# Patient Record
Sex: MALE | Race: WHITE | NOT HISPANIC OR LATINO | URBAN - METROPOLITAN AREA
[De-identification: names, ages, dates, MRNs, and addresses within clinical notes are randomized per-mention and may not be internally consistent; named-entity substitution may affect disease eponyms.]

---

## 2021-03-04 ENCOUNTER — IMMUNIZATION (OUTPATIENT)
Dept: VACCINE CLINIC | Facility: HOSPITAL | Age: 46
End: 2021-03-04

## 2021-03-04 PROCEDURE — 91301 HC SARSCO02 VAC 100MCG/0.5ML IM: CPT | Performed by: INTERNAL MEDICINE

## 2021-03-04 PROCEDURE — 0011A: CPT | Performed by: INTERNAL MEDICINE

## 2021-04-01 ENCOUNTER — IMMUNIZATION (OUTPATIENT)
Dept: VACCINE CLINIC | Facility: HOSPITAL | Age: 46
End: 2021-04-01

## 2021-04-01 PROCEDURE — 91301 HC SARSCO02 VAC 100MCG/0.5ML IM: CPT | Performed by: INTERNAL MEDICINE

## 2021-04-01 PROCEDURE — 0012A: CPT | Performed by: INTERNAL MEDICINE

## 2021-05-03 ENCOUNTER — OFFICE VISIT (OUTPATIENT)
Dept: ORTHOPEDIC SURGERY | Facility: CLINIC | Age: 46
End: 2021-05-03

## 2021-05-03 VITALS — BODY MASS INDEX: 37.8 KG/M2 | WEIGHT: 270 LBS | HEIGHT: 71 IN

## 2021-05-03 DIAGNOSIS — G89.29 CHRONIC PAIN OF RIGHT ANKLE: Primary | ICD-10-CM

## 2021-05-03 DIAGNOSIS — M25.571 CHRONIC PAIN OF RIGHT ANKLE: Primary | ICD-10-CM

## 2021-05-03 DIAGNOSIS — M79.671 FOOT PAIN, RIGHT: ICD-10-CM

## 2021-05-03 DIAGNOSIS — I87.8 VENOUS STASIS: ICD-10-CM

## 2021-05-03 PROCEDURE — 99203 OFFICE O/P NEW LOW 30 MIN: CPT | Performed by: ORTHOPAEDIC SURGERY

## 2021-05-03 NOTE — PROGRESS NOTES
"NEW PATIENT    Patient: Newton Rosado  : 1975    Primary Care Provider: Jens Romo DO    Requesting Provider: As above    Pain of the Right Ankle (Right ankle ORIF 2016 Dr. Martin)      History    Chief Complaint: right ankle/leg pain    History of Present Illness: this is a very pleasant 46 year old gentleman who sustained a severe right pilon fracture in , treated with ORIF in [place].  He went on to heal and has very good ROM.  However he has aching in the leg with weather change, with extensive activity, etc.  He notes that last Feb he was in [place] walking a lot and developed redness and swelling mid calf.  He does not wear compression socks except when traveling.  He has some numbness in the 5th toe and metatarsal area on the right that was present before the fracture, due to sciatic issues. He notes that ice makes the pain worse, heat helps.  He is wondering if this is his \"new normal\".  He works in sales, mostly sitting occupation. He does smoke cigars, I recommend he quit. I explained the risks of smoking, including hardening of the arteries, gangrene, amputation.  I explained nicotine poisons bone cells and increases the risk of nonunion of fractures and fusions.  I explained that studies show that smokers have FOUR times the risk of the general population when they have foot or ankle surgery.  (5min)       Tobacco Use: High Risk   • Smoking Tobacco Use: Current Some Day Smoker   • Smokeless Tobacco Use: Never Used     .     Current Outpatient Medications on File Prior to Visit   Medication Sig Dispense Refill   • [DISCONTINUED] cetirizine (ZyrTEC) 10 MG tablet Take 10 mg by mouth daily.     • [DISCONTINUED] cyclobenzaprine (FLEXERIL) 10 MG tablet TK 1 T PO ONCE DAILY PRN  2     No current facility-administered medications on file prior to visit.      Allergies   Allergen Reactions   • Aspirin Anaphylaxis      Past Medical History:   Diagnosis Date   • Dizziness    • " "Fracture, fibula    • Night sweats    • Ringing in ears      Past Surgical History:   Procedure Laterality Date   • ANKLE OPEN REDUCTION INTERNAL FIXATION     • HAND SURGERY Right      Family History   Problem Relation Age of Onset   • Hypertension Other    • No Known Problems Mother    • Arthritis Father    • Hypertension Father       Social History     Socioeconomic History   • Marital status:      Spouse name: Not on file   • Number of children: Not on file   • Years of education: Not on file   • Highest education level: Not on file   Tobacco Use   • Smoking status: Current Some Day Smoker     Types: Pipe   • Smokeless tobacco: Never Used   Substance and Sexual Activity   • Alcohol use: Yes     Comment: about 6 drinks per week   • Drug use: No   • Sexual activity: Defer        Review of Systems   Constitutional: Negative.    HENT: Negative.    Eyes: Negative.    Respiratory: Positive for apnea.    Cardiovascular: Negative.    Gastrointestinal: Negative.    Endocrine: Negative.    Genitourinary: Negative.    Musculoskeletal: Positive for arthralgias.   Skin: Negative.    Allergic/Immunologic: Positive for environmental allergies.   Neurological: Negative.    Hematological: Negative.    Psychiatric/Behavioral: Positive for sleep disturbance.       The following portions of the patient's history were reviewed and updated as appropriate: allergies, current medications, past family history, past medical history, past social history, past surgical history and problem list.    Physical Exam:   Ht 180.3 cm (70.98\")   Wt 122 kg (270 lb)   BMI 37.67 kg/m²   GENERAL: Body habitus: obese    Lower extremity edema: Right: trace; Left: trace    Varicose veins:  Right:mild  venous stasis pigment; Left:mild venous stasis pigment    Gait: normal     Mental Status:  awake and alert; oriented to person, place, and time    Voice:  clear  SKIN:  Lower extremity: mild venous pigment    Hair Growth(lower extremity):  " "Right:normal; Left:  normal  NAILS: Toenails: normal  HEENT: Head: Normocephalic, atraumatic,  without obvious abnormality.  eye: normal external eye, no icterus  ears:normal external ears  PULM:  Repiratory effort normal  CV:  Dorsalis Pedis:  Right: 2+; Left:2+    Posterior Tibial: Right:2+; Left:2+    Capillary Refill:  Brisk  MSK:  Hand:sensation intact      Tibia:  Right:  tender over subcutaneous border; Left:  tender over subcutaneous border      Ankle:  Right: Mildly tender over the hardware, very good range of motion almost symmetric with the left, lacking only a few degrees of inversion eversion and plantarflexion, well-healed medial and lateral incisions; Left:  non tender, ROM  normal and motor function  normal      Foot:  Right:  non tender, ROM  normal and motor function  normal; Left:  non tender, ROM  normal and motor function  normal      NEURO: Heel Walking:  Right:  normal; Left:  normal    Toe Walking:  Right:  normal; Left:  normal     [place] 5.07 monofilament test: Decreased right fifth toe and metatarsal head area otherwise intact    Lower extremity sensation: Intact except for right fifth toe and metatarsal area     Reflexes:  Biceps:  Right:  not tested; Left:  not tested           Quads:  Right:  not tested; Left:  not tested           Ankle:  Right:  not tested; Left:  not tested      Calf Atrophy:none    Motor Function: all 5/5         Medical Decision Making    Data Review:   ordered and reviewed x-rays today, reviewed radiology images, reviewed radiology results and reviewed outside records    Assessment and Plan/ Diagnosis/Treatment options:   1. Chronic pain of right ankle and leg  I explained to the patient that I agree with him, this is his \"new normal\".  Radiographically and physiologically, he has had an excellent result from what looks like a very difficult fracture.  He has almost symmetric range of motion, excellent cartilage space on the weightbearing films.  He was " worried that the screws might be backing out I explained there not, and that would be very rare.  We talked about removing the hardware but I would not recommend that, the risk of refracture would be high.  I think 2 things will help his symptoms.  1 is to wear compression socks daily.  I explained how these help.  I looked at his picture from [facility] and the redness is from dependent rubor.  Secondly we talked about weight loss.  He has gained 30 pounds during the pandemic which is common.  His BMI is 37.67, and he knows weight loss would help.  I explained that I understand his symptoms are not perfect, but given the severity of the original fracture he really has had a good outcome.  He was reassured.  I will be happy to see him anytime.  I did again recommend he quit smoking.  - XR Ankle 2 View Right    2. Venous stasis  I explained edema and venous stasis to the patient, and the often hereditary nature of the problem, and the contribution of weight, trauma, etc. I explained how they cause edema (due to gravity, etc) I explained how they can lead to ulceration.  I explained how they can cause pain, stiffness, aching, cramping, etc. I explained that it is a very very common problem, so common that even [employer] markets compression stockings.  I recommend wearing support stockings (compression stockings) daily, we discussed what type and where to find them                  Radiology Ordered []  Radiology Reports Reviewed []      Radiology Images Reviewed []   Labs Reviewed []    Labs Ordered []   PCP Records Reviewed []    Provider Records Reviewed []    ER Records Reviewed []    Hospital Records Reviewed []    History Obtained From Family []    Phone conversation with Provider []    Records Requested []        Trish Loco MD

## 2021-08-05 ENCOUNTER — OFFICE VISIT (OUTPATIENT)
Dept: BARIATRICS/WEIGHT MGMT | Facility: CLINIC | Age: 46
End: 2021-08-05

## 2021-08-05 VITALS
TEMPERATURE: 96.9 F | DIASTOLIC BLOOD PRESSURE: 68 MMHG | HEIGHT: 71 IN | BODY MASS INDEX: 37.59 KG/M2 | SYSTOLIC BLOOD PRESSURE: 128 MMHG | RESPIRATION RATE: 18 BRPM | OXYGEN SATURATION: 99 % | HEART RATE: 73 BPM | WEIGHT: 268.5 LBS

## 2021-08-05 DIAGNOSIS — Z51.81 ENCOUNTER FOR THERAPEUTIC DRUG MONITORING: ICD-10-CM

## 2021-08-05 DIAGNOSIS — G47.33 OSA (OBSTRUCTIVE SLEEP APNEA): ICD-10-CM

## 2021-08-05 DIAGNOSIS — G47.09 OTHER INSOMNIA: ICD-10-CM

## 2021-08-05 DIAGNOSIS — E66.01 CLASS 2 SEVERE OBESITY DUE TO EXCESS CALORIES WITH SERIOUS COMORBIDITY AND BODY MASS INDEX (BMI) OF 37.0 TO 37.9 IN ADULT (HCC): Primary | ICD-10-CM

## 2021-08-05 DIAGNOSIS — R12 HEARTBURN: ICD-10-CM

## 2021-08-05 PROCEDURE — 99215 OFFICE O/P EST HI 40 MIN: CPT | Performed by: NURSE PRACTITIONER

## 2021-08-05 RX ORDER — MULTIPLE VITAMINS W/ MINERALS TAB 9MG-400MCG
1 TAB ORAL DAILY
Qty: 30 TABLET | Refills: 2
Start: 2021-08-05

## 2021-08-05 RX ORDER — CHLORAL HYDRATE 500 MG
1000 CAPSULE ORAL 2 TIMES DAILY WITH MEALS
Start: 2021-08-05

## 2021-08-05 NOTE — ASSESSMENT & PLAN NOTE
Patient's (Body mass index is 37.45 kg/m².) indicates that they are morbidly obese (BMI > 40 or > 35 with obesity - related health condition) with health conditions that include obstructive sleep apnea and GERD . Weight is newly identified. BMI is is above average; BMI management plan is completed. We discussed low calorie, low carb based diet program, portion control, increasing exercise, joining a fitness center or start home based exercise program and an jules-based approach such as [employer] or Lose It.     Topics of discussion included obesity as a disease, nutritional education on food groups, exercise, and medications. Patient was instructed in adequate protein, controlled carb and controlled fat intake.   Patient received instructions on using the medicines as a tool in controlling their weight with nutritional and behavioral changes. Risks and benefits were discussed. I believe the potential benefits of medication helping to decrease weight outweighs the risks. Patient is to try nutritonal/behavioral changes only first.   Patient received our clinic education booklet.   Our patient consent form was reviewed including potential risks of weight loss. We also reviewed our confidentiality and HIPPA statements. Patients current FITT score was reviewed along with current capability for exercise tolerance and a patient will work towards a FITT score of:     Frequency   Intensity Time Strength Training   []   0 None  []   0 None  []   0 None  []   0 None    []   1 (1-2x/week) []   1 (light) []   1 (<10 min) []   1 (1x/week)   [x]   2 (3-5x/week) [x]   2 (moderate) []   2 (10-20 min) [x]   2 (2x/week)   []   3 (daily)   []   3 (moderately hard)  []   4 (very hard) []   3 (20-30 min)  [x]   4 (>30 min) []   3 (3-4x/week)       Patient's past medical history was reviewed in detail and barriers to weight loss were identified and discussed. Past efforts at weight reduction on their own as well as under physician  supervision were documented and discussed.  I advised patient to continue routine care with their Primary Care Provider.     Nutritional recommendations and goals were reviewed including Calories:2063-8005 daily adjusted for exercise calories burnt, Protein: 124-155 g daily, Net carbs (total carb - fiber) of 50-75g per day.  Start to keep a food journal and bring into next visit in 2 weeks for review.   Practice the behavioral modification technique of mindful eating.   Take one MVI daily and 2000mg fish oil daily.   Take other medications and supplements as directed.  Improve sleep, set boundaries around alcohol use, increase water, make exercise plan. Consider VLCD.

## 2021-08-05 NOTE — PROGRESS NOTES
"[facility]   [address]  [place] [address]    Name: Newton Rosado  : 1975  Patient Care Team:  Jens Romo DO as PCP - General (Family Medicine)  Vignesh Maher MD as PCP - Family Medicine  Provider, No Known  Provider, No Known    Chief Complaint;: No chief complaint on file.       HPI      Office visit for Newton Rosado, a 46 y.o. {Desc; male/female:81497}, established patient with ***, for obesity management. Patient is {Satisfied/unsatisfied/unsure:92451} with weight loss progress. Hunger control has {poor/mod/well:92432}. Reports no side effects of prescribed medications today.   ***  The patient is exercising with a FITT score of:    Patient's Body mass index is 37.45 kg/m². indicating that he is {weight categories:02893}.      Change in weight since last visit: ***    Recent Weight History:   Wt Readings from Last 3 Encounters:   21 122 kg (268 lb 8 oz)   21 122 kg (270 lb)   20 116 kg (255 lb)     Start Weight: ***  Total Loss/%Loss of BBW:     Body composition analysis completed and showed: ***  %body fat:  Total fat mass (in lbs):    VS   Vitals:    21 0815   BP: 128/68   BP Location: Left arm   Patient Position: Sitting   Cuff Size: Adult   Pulse: 73   Resp: 18   Temp: 96.9 °F (36.1 °C)   TempSrc: Temporal   SpO2: 99%   Weight: 122 kg (268 lb 8 oz)   Height: 180.3 cm (71\")   PainSc: 0-No pain       Measurements (in inches)  Waist: ***    Review of Systems    Past Medical History:   Diagnosis Date   • Dizziness    • Fracture, fibula    • Heartburn    • Night sweats    • Ringing in ears        Patient Active Problem List   Diagnosis   • History of open reduction and internal fixation (ORIF) procedure       No current outpatient medications on file.    Physical Exam:    General:  .{CR New patient general:99270::\"well developed; well nourished\",\"no acute distress\",\"obese \"}   Lungs:  {Chris " "Exam-lung(s):57800::\"breathing is unlabored\",\"clear to auscultation bilaterally\"}   Heart:  {Chris Exam-cardiac:80766::\"regular rate and rhythm, S1, S2 normal, no murmur, click, rub or gallop\"}     ASSESSMENT/PLAN:   Diagnoses and all orders for this visit:    1. Encounter for therapeutic drug monitoring (Primary)  -     Urine Drug Screen - Urine, Clean Catch        I have instructed the patient to continue with pursuit of medical weight loss as a part of this program. Patient {Does(not):59192::\"does\"} meet criteria for use of anorectics at this time as {criteria:68791::\"BMI >27\"}. Continue nutritional focus and work towards new exercise FITT goal of:     The current plan for this month includes: {WeightLossInst:16894}    I spent 20 minutes face to face with patient and 15 minutes were spent counseling the patient on obesity and behavior change.     Plan of care reviewed with the patient at the conclusion of today's visit. We discussed the risks, benefits, and limitations of treatments. Continue medications and OTC supplements as discussed. Patient verbalizes understanding of and agreement with management plan.      No follow-ups on file.      ERAN Anne                      "

## 2021-08-05 NOTE — PROGRESS NOTES
[facility]    Formerly [facility] [address]  [place] [address]    Name: Newton Rosado  : 1975  Patient Care Team:  Jens Romo DO as PCP - General (Family Medicine)  Vignesh Maher MD as PCP - Family Medicine  Provider, No Known  Provider, No Known    Chief Complaint:   Chief Complaint   Patient presents with   • Consult   • Obesity      HPI:   Mr. Newton Rosado is a 46 y.o. male presents for initiation of medically supervised weight loss.  he has tried other methods/programs to lose weight including:  exercise & has not ever tried medication to assist with weight loss.  With past attempts he has been able to make lifestyle changes for a few months then his routine changes and he falls into bad habits. His weight has increased about 30lb over the last year. Prior to the pandemic he was routinely swimming at lunch time and was pretty focused on eating better. When the pool closed he lost motivation to work out. He travels to [place] a lot with his work, hasn't been exercising there and often drinks because he is bored and it allows him to socialize. He is ready for lifestyle changes to be a part of his weight loss journey. His motivation is to prevent health complications from health choices. Some areas that he desires to improve: exercise, drink less alcohol, sleep better, drink more water, eat healthier. He is interested in medications to assist with weight loss and appetite.   Had pulmonary embolism after leg surgery. No history of blood clotting disorders. He was diagnosed with DANIELE fall . Heartburn has improved with CPAP. Uses tums once or twice a month. Denies heart racing. Can tell when his blood pressure is elevated- feels swollen all over. Does have cuff at home but hasn't used it in a long time. Has had white coat syndrome in the past. Insomnia: wakes up nightly at 3:00 then has trouble falling back asleep. Reading on his phone helps. Does not drink  caffeine after 12pm. Sleep did improve when he abstained from alcohol in January.      Patient's Body mass index is 37.45 kg/m².    Lifetime non-pregnant maximum weight:   Weight 1 year ago: 250 lb  Weight 5 years ago: 240 lb  The following have contributed to weight gain: Work    The following seem to sabotage weight loss efforts:Lack of time for planning & self, comfort/stress eating, enjoyment of food and social events, eating late, liquid calories such as alcohol, specific food cravings like carbohydrates, boredom eating.     Review of Systems:   Review of Systems   Constitutional: Positive for fatigue.   HENT: Negative for trouble swallowing.    Eyes: Negative for blurred vision.   Respiratory: Negative for chest tightness and shortness of breath.    Cardiovascular: Positive for leg swelling. Negative for chest pain and palpitations.   Gastrointestinal: Positive for GERD. Negative for constipation, diarrhea and nausea.   Endocrine: Negative for polydipsia, polyphagia and polyuria.   Musculoskeletal: Positive for joint swelling. Negative for myalgias.        Right ankle- prior operation   Skin: Negative for dry skin and pallor.   Neurological: Negative for seizures, syncope, weakness and headache.   Psychiatric/Behavioral: Positive for sleep disturbance, depressed mood and stress. The patient is not nervous/anxious.        History:    Past Medical History:   Diagnosis Date   • Dizziness    • Fracture, fibula    • Heartburn    • Night sweats    • DANIELE (obstructive sleep apnea) 2020    Started CPAP november 2020   • Pulmonary embolism (CMS/HCC) 2016    Following ankle surgery    • Ringing in ears        Past Surgical History:   Procedure Laterality Date   • ANKLE OPEN REDUCTION INTERNAL FIXATION     • HAND SURGERY Right        Family History   Problem Relation Age of Onset   • Hypertension Other    • No Known Problems Mother    • Arthritis Father    • Hypertension Father    • Stroke Father        Social History  "    Socioeconomic History   • Marital status:      Spouse name: Not on file   • Number of children: Not on file   • Years of education: Not on file   • Highest education level: Not on file   Tobacco Use   • Smoking status: Current Some Day Smoker     Types: Pipe, Cigars   • Smokeless tobacco: Never Used   Substance and Sexual Activity   • Alcohol use: Yes     Comment: about 6 drinks per week   • Drug use: No   • Sexual activity: Defer       Allergies   Allergen Reactions   • Aspirin Anaphylaxis       Current Outpatient Medications:   •  multivitamin with minerals (Multivitamin Adults) tablet tablet, Take 1 tablet by mouth Daily., Disp: 30 tablet, Rfl: 2  •  Omega-3 Fatty Acids (fish oil) 1000 MG capsule capsule, Take 1 capsule by mouth 2 (Two) Times a Day With Meals., Disp: , Rfl:       Exercise:      Current FITT Score      Frequency   Intensity Time Strength Training   []   0 None  []   0 None  []   0 None  [x]   0 None    [x]   1 (1-2x/week) []   1 (light) []   1 (<10 min) []   1 (1x/week)   []   2 (3-5x/week) [x]   2 (moderate) []   2 (10-20 min) []   2 (2x/week)   []   3 (daily)   []   3 (moderately hard)  []   4 (very hard) [x]   3 (20-30 min)  []   4 (>30 min) []   3 (3-4x/week)         Water: 30 oz/day  Alcohol: CAGE is positive   Other beverages:  coffee    PHQ-9 Total Score: 6    VS   Vitals:    08/05/21 0815   BP: 128/68   BP Location: Left arm   Patient Position: Sitting   Cuff Size: Adult   Pulse: 73   Resp: 18   Temp: 96.9 °F (36.1 °C)   TempSrc: Temporal   SpO2: 99%   Weight: 122 kg (268 lb 8 oz)   Height: 180.3 cm (71\")   PainSc: 0-No pain       Start Weight/BMI:268.5 lb / 37.4  Patient's Weight Loss Goal: Lose 50 lbs    Body composition analysis completed and showed:   %body fat:35.7%  Total fat mass (in lbs):96.0 lb  Fat free mass (in lbs):172.5 lb  Total body water (in lb):126.5 lb  BMR: 2338    Measurements (in inches)  Neck: 18  Chest: 48.5  Waist: 47.25  Hips: 47  Thighs: 46    Physical " Exam:  General:  well developed; well nourished  no acute distress  central obesity   Skin:  no abnormalities noted   Thyroid: normal to inspection and palpation   Lungs:  breathing is unlabored  clear to auscultation bilaterally   Heart:  regular rate and rhythm, S1, S2 normal, no murmur, click, rub or gallop   Abdomen: abdomen is soft without significant tenderness, masses, organomegaly or guarding   Neuro: normal without focal findings, mental status, speech normal, alert and oriented x3 and CHARMAINE   Extremeties Mild edema right ankle   Psych: oriented to time, place and person, mood and affect are within normal limits, pt is a good historian; no memory problems were noted        Results Review:    I have reviewed and discussed with the patient:  Progress Notes by Trish Tavarez MD (05/03/2021 08:40): weight loss recommended  SCANNED EKG (02/19/2016): NSR    ASSESSMENT/PLAN:     Diagnoses and all orders for this visit:    1. Class 2 severe obesity due to excess calories with serious comorbidity and body mass index (BMI) of 37.0 to 37.9 in adult (CMS/[facility]) (Primary)  Assessment & Plan:  Patient's (Body mass index is 37.45 kg/m².) indicates that they are morbidly obese (BMI > 40 or > 35 with obesity - related health condition) with health conditions that include obstructive sleep apnea and GERD . Weight is newly identified. BMI is is above average; BMI management plan is completed. We discussed low calorie, low carb based diet program, portion control, increasing exercise, joining a fitness center or start home based exercise program and an jules-based approach such as iSites Pal or Lose It.     Topics of discussion included obesity as a disease, nutritional education on food groups, exercise, and medications. Patient was instructed in adequate protein, controlled carb and controlled fat intake.   Patient received instructions on using the medicines as a tool in controlling their weight with nutritional and  behavioral changes. Risks and benefits were discussed. I believe the potential benefits of medication helping to decrease weight outweighs the risks. Patient is to try nutritonal/behavioral changes only first.   Patient received our clinic education booklet.   Our patient consent form was reviewed including potential risks of weight loss. We also reviewed our confidentiality and HIPPA statements. Patients current FITT score was reviewed along with current capability for exercise tolerance and a patient will work towards a FITT score of:     Frequency   Intensity Time Strength Training   []   0 None  []   0 None  []   0 None  []   0 None    []   1 (1-2x/week) []   1 (light) []   1 (<10 min) []   1 (1x/week)   [x]   2 (3-5x/week) [x]   2 (moderate) []   2 (10-20 min) [x]   2 (2x/week)   []   3 (daily)   []   3 (moderately hard)  []   4 (very hard) []   3 (20-30 min)  [x]   4 (>30 min) []   3 (3-4x/week)       Patient's past medical history was reviewed in detail and barriers to weight loss were identified and discussed. Past efforts at weight reduction on their own as well as under physician supervision were documented and discussed.  I advised patient to continue routine care with their Primary Care Provider.     Nutritional recommendations and goals were reviewed including Calories:8743-8876 daily adjusted for exercise calories burnt, Protein: 124-155 g daily, Net carbs (total carb - fiber) of 50-75g per day.  Start to keep a food journal and bring into next visit in 2 weeks for review.   Practice the behavioral modification technique of mindful eating.   Take one MVI daily and 2000mg fish oil daily.   Take other medications and supplements as directed.  Improve sleep, set boundaries around alcohol use, increase water, make exercise plan. Consider VLCD.         2. Heartburn    3. DANIELE (obstructive sleep apnea)  Assessment & Plan:  Weight reduction of 10-15% can help.       4. Other insomnia  Assessment & Plan:  Goal  7-8 hours of uninterrupted sleep. Discussed sleep hygiene. Consider topiramate.       5. Encounter for therapeutic drug monitoring  -     Urine Drug Screen - Urine, Clean Catch    Other orders  -     Omega-3 Fatty Acids (fish oil) 1000 MG capsule capsule; Take 1 capsule by mouth 2 (Two) Times a Day With Meals.  -     multivitamin with minerals (Multivitamin Adults) tablet tablet; Take 1 tablet by mouth Daily.  Dispense: 30 tablet; Refill: 2       Treatment Plan  Non-Weight Loss Goals: Improved mobility: has been addressed. , Improved depression: has been addressed., Improved sleep apnea: has been addressed.  and decrease risk of preventable diseases.   Initial goal of 5-10% loss of beginning body weight  Goals:  1. Personal Goals: <200lb    Return in about 2 weeks (around 8/19/2021) for Next scheduled follow up, Labs this visit.  I spent 60 minutes caring for Newton on this date of service. This time includes time spent by me in the following activities:preparing for the visit, reviewing tests, obtaining and/or reviewing a separately obtained history, performing a medically appropriate examination and/or evaluation , counseling and educating the patient/family/caregiver, ordering medications, tests, or procedures and documenting information in the medical record      ERAN Anne

## 2021-08-06 LAB
AMPHETAMINES UR QL SCN: NEGATIVE NG/ML
BARBITURATES UR QL SCN: NEGATIVE NG/ML
BENZODIAZ UR QL SCN: NEGATIVE NG/ML
BZE UR QL SCN: NEGATIVE NG/ML
CANNABINOIDS UR QL SCN: POSITIVE NG/ML
CREAT UR-MCNC: 176.7 MG/DL (ref 20–300)
LABORATORY COMMENT REPORT: ABNORMAL
METHADONE UR QL SCN: NEGATIVE NG/ML
OPIATES UR QL SCN: NEGATIVE NG/ML
OXYCODONE+OXYMORPHONE UR QL SCN: NEGATIVE NG/ML
PCP UR QL: NEGATIVE NG/ML
PH UR: 5.2 [PH] (ref 4.5–8.9)
PROPOXYPH UR QL SCN: NEGATIVE NG/ML